# Patient Record
Sex: MALE | Race: WHITE | Employment: FULL TIME | URBAN - METROPOLITAN AREA
[De-identification: names, ages, dates, MRNs, and addresses within clinical notes are randomized per-mention and may not be internally consistent; named-entity substitution may affect disease eponyms.]

---

## 2018-12-11 ENCOUNTER — HOSPITAL ENCOUNTER (EMERGENCY)
Age: 43
Discharge: HOME | End: 2018-12-11
Attending: EMERGENCY MEDICINE
Payer: COMMERCIAL

## 2018-12-11 VITALS
HEART RATE: 82 BPM | RESPIRATION RATE: 14 BRPM | TEMPERATURE: 98.7 F | WEIGHT: 147 LBS | BODY MASS INDEX: 21.77 KG/M2 | OXYGEN SATURATION: 100 % | SYSTOLIC BLOOD PRESSURE: 168 MMHG | HEIGHT: 69 IN | DIASTOLIC BLOOD PRESSURE: 112 MMHG

## 2018-12-11 DIAGNOSIS — H92.01 RIGHT EAR PAIN: Primary | ICD-10-CM

## 2018-12-11 PROCEDURE — 99282 EMERGENCY DEPT VISIT SF MDM: CPT

## 2018-12-11 RX ORDER — OFLOXACIN 3 MG/ML
5 SOLUTION AURICULAR (OTIC) 2 TIMES DAILY
Qty: 10 ML | Refills: 0 | Status: SHIPPED | OUTPATIENT
Start: 2018-12-11 | End: 2018-12-21

## 2018-12-11 RX ORDER — HYDROCODONE BITARTRATE AND ACETAMINOPHEN 5; 325 MG/1; MG/1
1 TABLET ORAL EVERY 6 HOURS PRN
Qty: 5 TABLET | Refills: 0 | Status: SHIPPED | OUTPATIENT
Start: 2018-12-11 | End: 2018-12-14

## 2018-12-11 RX ORDER — AZITHROMYCIN 250 MG/1
TABLET, FILM COATED ORAL
Qty: 1 PACKET | Refills: 0 | Status: SHIPPED | OUTPATIENT
Start: 2018-12-11 | End: 2018-12-21

## 2018-12-11 ASSESSMENT — PAIN DESCRIPTION - DESCRIPTORS
DESCRIPTORS: ACHING;THROBBING
DESCRIPTORS: ACHING;THROBBING

## 2018-12-11 ASSESSMENT — PAIN DESCRIPTION - ORIENTATION
ORIENTATION: RIGHT
ORIENTATION: RIGHT

## 2018-12-11 ASSESSMENT — PAIN - FUNCTIONAL ASSESSMENT: PAIN_FUNCTIONAL_ASSESSMENT: 0-10

## 2018-12-11 ASSESSMENT — PAIN DESCRIPTION - LOCATION
LOCATION: EAR
LOCATION: EAR

## 2018-12-11 ASSESSMENT — PAIN SCALES - GENERAL
PAINLEVEL_OUTOF10: 8
PAINLEVEL_OUTOF10: 8

## 2018-12-11 ASSESSMENT — PAIN DESCRIPTION - PAIN TYPE: TYPE: ACUTE PAIN

## 2018-12-11 ASSESSMENT — PAIN DESCRIPTION - FREQUENCY: FREQUENCY: CONTINUOUS

## 2018-12-11 NOTE — ED NOTES
Ambulates to room #2 with the complaint of right ear ache, states while in [place] he had a cough and then his ear started hurting  states that he has had a ruptured eardrum in the past and \"that what this feels like\"  No drainage and does not believe that he has had a fever.        Latasha Zimmerman RN  12/11/18 0363

## 2018-12-11 NOTE — ED PROVIDER NOTES
Triage Chief Complaint:   Otalgia (right earache that started approx 4 hours ago)    [place]:  Anabell Estrada is a 44 y.o. male that presents complaining of right ear pain. He denies trauma. He denies nausea, vomiting, fever, chills, headache    ROS:  At least 9 systems reviewed and otherwise acutely negative except as in the [address] History reviewed. No pertinent past medical history. History reviewed. No pertinent surgical history. History reviewed. No pertinent family history. Social History     Social History    Marital status:      Spouse name: N/A    Number of children: N/A    Years of education: N/A     Occupational History    Not on file. Social History Main Topics    Smoking status: Never Smoker    Smokeless tobacco: Never Used    Alcohol use Yes      Comment: socially    Drug use: No    Sexual activity: Not Currently     Other Topics Concern    Not on file     Social History Narrative    No narrative on file     No current facility-administered medications for this encounter. Current Outpatient Prescriptions   Medication Sig Dispense Refill    HYDROcodone-acetaminophen (NORCO) 5-325 MG per tablet Take 1 tablet by mouth every 6 hours as needed for Pain for up to 3 days. Susana Bar 5 tablet 0    ofloxacin (FLOXIN) 0.3 % otic solution Place 5 drops into the right ear 2 times daily for 10 days 10 mL 0    azithromycin (ZITHROMAX) 250 MG tablet Take 2 tablets (500 mg) on Day 1, followed by 1 tablet (250 mg) once daily on Days 2 through 5. 1 packet 0     Allergies   Allergen Reactions    Pcn [Penicillins]      Break out and fever    Sulfa Antibiotics      Break out       [unfilled]    Nursing Notes Reviewed    Physical Exam:  Vitals:    12/11/18 0233   BP: (!) 168/112   Pulse: 82   Resp:    Temp:    SpO2:        GENERAL APPEARANCE: Awake and alert. Cooperative. No acute distress. HEAD: Normocephalic. Atraumatic. EYES: EOM's grossly intact. Sclera anicteric.   ENT: Mucous membranes are

## 2022-05-19 ENCOUNTER — OFFICE VISIT (OUTPATIENT)
Dept: ORTHOPEDIC SURGERY | Age: 47
End: 2022-05-19
Payer: COMMERCIAL

## 2022-05-19 ENCOUNTER — TELEPHONE (OUTPATIENT)
Dept: ORTHOPEDIC SURGERY | Age: 47
End: 2022-05-19

## 2022-05-19 VITALS — BODY MASS INDEX: 22.66 KG/M2 | WEIGHT: 153 LBS | HEIGHT: 69 IN

## 2022-05-19 DIAGNOSIS — G81.14 LEFT SPASTIC HEMIPLEGIA (HCC): ICD-10-CM

## 2022-05-19 DIAGNOSIS — G80.2 SPASTIC HEMIPLEGIC CEREBRAL PALSY (HCC): Primary | ICD-10-CM

## 2022-05-19 PROCEDURE — 99204 OFFICE O/P NEW MOD 45 MIN: CPT | Performed by: PHYSICAL MEDICINE & REHABILITATION

## 2022-05-19 RX ORDER — METOPROLOL SUCCINATE 100 MG/1
100 TABLET, EXTENDED RELEASE ORAL DAILY
COMMUNITY
Start: 2021-09-21

## 2022-05-19 NOTE — TELEPHONE ENCOUNTER
Submitted PA for BOTOX  Via [employer] STATUS: NO PA REQUIRED. If this requires a response please respond to the pool ( P [facility] [address]). Thank you please advise patient.

## 2022-05-19 NOTE — PROGRESS NOTES
[facility]) Physical Medicine and Rehabilitation   Outpatient Progress Note  Analilia Mercado. DO Anali, MPH    Patient Name: Kevon Brown MRN: [id number]   Age: 56 y.o. YOB: 1975   Sex: male      CHIEF COMPLAINT     Chief Complaint   Patient presents with    Other     Patient has mild cerebral palsy and has weakness and limited mobility in left arm. His wife works for [employer] with [address] and found your name and he would like to explore Botox        HISTORY OF PRESENT ILLNESS   Kevon Brown is a 56 y.o. male with a lifelong history of left spastic hemiplegia due to cerebral palsy. He has never had any medication for this issue but has participated in therapy and performs a home exercise program every night. He has never been able to supinate his arm and has never been able to fully extend his arm above his head without help. He has talked to his wife and decided to look into botox for possible improvement of his spasticity. Today he is here for a botox evaluation. PAST MEDICAL HISTORY    No past medical history on file. PAST SURGICAL HISTORY   No past surgical history on file. MEDICATIONS     Current Outpatient Medications   Medication Sig Dispense Refill    metoprolol succinate (TOPROL XL) 100 MG extended release tablet Take 100 mg by mouth daily       No current facility-administered medications for this visit. ALLERGIES     Allergies   Allergen Reactions    Pcn [Penicillins]      Break out and fever    Sulfa Antibiotics      Break out       FAMILY HISTORY   No family history on file. SOCIAL HISTORY     Social History     Socioeconomic History    Marital status:      Spouse name: Not on file    Number of children: Not on file    Years of education: Not on file    Highest education level: Not on file   Occupational History    Not on file   Tobacco Use    Smoking status: Never Smoker    Smokeless tobacco: Never Used   Substance and Sexual Activity    Alcohol use:  Yes Comment: socially    Drug use: No    Sexual activity: Not Currently   Other Topics Concern    Not on file   Social History Narrative    Not on file     Social Determinants of Health     Financial Resource Strain:     Difficulty of Paying Living Expenses: Not on file   Food Insecurity:     Worried About Running Out of Food in the Last Year: Not on file    Zahida of Food in the Last Year: Not on file   Transportation Needs:     Lack of Transportation (Medical): Not on file    Lack of Transportation (Non-Medical):  Not on file   Physical Activity:     Days of Exercise per Week: Not on file    Minutes of Exercise per Session: Not on file   Stress:     Feeling of Stress : Not on file   Social Connections:     Frequency of Communication with Friends and Family: Not on file    Frequency of Social Gatherings with Friends and Family: Not on file    Attends Restoration Services: Not on file    Active Member of [address] [place] [address] or Organizations: Not on file    Attends Club or Organization Meetings: Not on file    Marital Status: Not on file   Intimate Partner Violence:     Fear of Current or Ex-Partner: Not on file    Emotionally Abused: Not on file    Physically Abused: Not on file    Sexually Abused: Not on file   Housing Stability:     Unable to Pay for Housing in the Last Year: Not on file    Number of [place] in the Last Year: Not on file    Unstable Housing in the Last Year: Not on file       REVIEW OF SYSTEMS   General: no fever, chills, night sweats, anorexia, malaise, fatigue, or weight change  Hematologic:  no unexplained bleeding or bruising  HEENT:   no nasal congestion, rhinorrhea, sore throat, or facial pain  Respiratory:  no cough, dyspnea, or chest pain  Cardiovascular:  no angina, HOANG, PND, orthopnea, dependent edema, or palpitations  Gastrointestinal:  no nausea, vomiting, diarrhea, constipation, or abdominal pain  Genitourinary:  no urinary urgency, frequency, dysuria, or hematuria  Musculoskeletal: see HPI  Endocrine:  no heat or cold intolerance and no polyphagia, polydipsia, or polyuria  Skin:  no skin eruptions or changing lesions  Neurologic:  no focal weakness, numbness/tingling, tremor, or severe headache. See HPI. See HPI for pertinent positives. PHYSICAL EXAM   Vital Signs:   Vitals:    05/19/22 1325   Weight: 153 lb (69.4 kg)   Height: 5' 9\" (1.753 m)       General appearance: healthy, alert, no distress  Skin: Skin color, texture, turgor normal. No rashes or lesions  HEENT: atraumatic, normocephalic. PERRL  Respiratory: Unlabored breathing  Lymphatic: No adenopathy   Neuro: Alert and oriented, normal distal sensation, normal bilateral DTRs  Vascular: Normal distal capillary and distal pulses  Muskuloskeletal Exam: Left arm ROM- 20 degrees of supination. 70 degrees flexion  Wrist extension -20 degrees  Fingers with flexion at all time unless passively ROM to extension- almost to full extension   Good strength throughout with limited active ROM. Modified bebe scale- bicep 2                                         - FDS- 1+                                         - FDP- 1+                                         - FCU- 2                                         - FCR- 2      IMPRESSION     1. Spastic hemiplegic cerebral palsy (HCC)    2. Left spastic hemiplegia ([address]         PLAN   I had a lengthy discussion with patient today regarding diagnosis and treatment options and recommendations. Left UE spastic hemiplegia- wants local intervention and not systemic oral medication. This is very reasonable in his case as he has been very functional over the past 46 years. Botox intervention should improve his overall ROM, prevent contractures and improve ADLs. 1. Botox- Start 300 units- 0 waste   - FDS- 25 units  - FDP- 25 units  - FCU- 75 units  - FCR- 75 units   - Bicep- 100 units     2. Follow up with precert    3.  Continue HEP      FOLLOWUP     Return in about 3 months (around 8/19/2022). No orders of the defined types were placed in this encounter. No orders of the defined types were placed in this encounter.       Patient was instructed on appropriate use of braces, participation in home exercise programs, healthy lifestyle choices and weight loss as appropriate     Dashawn Briones DO

## 2022-05-19 NOTE — TELEPHONE ENCOUNTER
[facility] INFORMATION    DRUG:  BOTOX [id number]    ADMIN:  [id number], [id number]    DX CODE: G81.14    UNITS:  300    LAST OV DATE: 05/19/22

## 2022-05-20 NOTE — TELEPHONE ENCOUNTER
Left message for patient to call and schedule his Botox injections. I need to order his medication and I can have it delivered in a few days. He may schedule with Dr. Mirna Chappell after 05/25/22.

## 2022-05-26 ENCOUNTER — OFFICE VISIT (OUTPATIENT)
Dept: ORTHOPEDIC SURGERY | Age: 47
End: 2022-05-26
Payer: COMMERCIAL

## 2022-05-26 VITALS — BODY MASS INDEX: 22.66 KG/M2 | HEIGHT: 69 IN | WEIGHT: 153 LBS

## 2022-05-26 DIAGNOSIS — G81.14 LEFT SPASTIC HEMIPLEGIA (HCC): ICD-10-CM

## 2022-05-26 DIAGNOSIS — G80.2 SPASTIC HEMIPLEGIC CEREBRAL PALSY (HCC): Primary | ICD-10-CM

## 2022-05-26 PROCEDURE — 99214 OFFICE O/P EST MOD 30 MIN: CPT | Performed by: PHYSICAL MEDICINE & REHABILITATION

## 2022-05-26 PROCEDURE — 95874 GUIDE NERV DESTR NEEDLE EMG: CPT | Performed by: PHYSICAL MEDICINE & REHABILITATION

## 2022-05-26 PROCEDURE — 64644 CHEMODENERV 1 EXTREM 5/> MUS: CPT | Performed by: PHYSICAL MEDICINE & REHABILITATION

## 2022-05-26 NOTE — PROGRESS NOTES
[facility]) Physical Medicine and Rehabilitation  Outpatient Progress Note  Yoana Taylor. DO Anali    Patient Name: Hipolito Hill MRN: [id number]   Age: 56 y.o. YOB: 1975   Sex: male      [address] Complaint   Patient presents with    Follow-up     Botox injection LUE   spastic hemiplegia     HISTORY OF PRESENT ILLNESS   Hipolito Hill is a 56 y.o. male with a lifelong history of left spastic hemiplegia due to cerebral palsy. He has never had any medication for this issue but has participated in therapy and performs a home exercise program every night. He has never been able to supinate his arm and has never been able to fully extend his arm above his head without help. He has talked to his wife and decided to look into botox for possible improvement of his spasticity. Today he comes in for approved botox injections. PAST MEDICAL HISTORY    No past medical history on file. PAST SURGICAL HISTORY   No past surgical history on file. MEDICATIONS     Current Outpatient Medications   Medication Sig Dispense Refill    metoprolol succinate (TOPROL XL) 100 MG extended release tablet Take 100 mg by mouth daily       Current Facility-Administered Medications   Medication Dose Route Frequency Provider Last Rate Last Admin    onabotulinumtoxin A (BOTOX) injection 300 Units  300 Units IntraMUSCular Once Dashawn Briones DO           ALLERGIES     Allergies   Allergen Reactions    Pcn [Penicillins]      Break out and fever    Sulfa Antibiotics      Break out       FAMILY HISTORY   No family history on file.     SOCIAL HISTORY     Social History     Socioeconomic History    Marital status:      Spouse name: None    Number of children: None    Years of education: None    Highest education level: None   Occupational History    None   Tobacco Use    Smoking status: Never Smoker    Smokeless tobacco: Never Used   Substance and Sexual Activity    Alcohol use: Yes     Comment: socially  Drug use: No    Sexual activity: Not Currently   Other Topics Concern    None   Social History Narrative    None     Social Determinants of Health     Financial Resource Strain:     Difficulty of Paying Living Expenses: Not on file   Food Insecurity:     Worried About Running Out of Food in the Last Year: Not on file    Zahida of Food in the Last Year: Not on file   Transportation Needs:     Lack of Transportation (Medical): Not on file    Lack of Transportation (Non-Medical):  Not on file   Physical Activity:     Days of Exercise per Week: Not on file    Minutes of Exercise per Session: Not on file   Stress:     Feeling of Stress : Not on file   Social Connections:     Frequency of Communication with Friends and Family: Not on file    Frequency of Social Gatherings with Friends and Family: Not on file    Attends Jewish Services: Not on file    Active Member of [address] [place] [address] [employer] or Organizations: Not on file    Attends Club or Organization Meetings: Not on file    Marital Status: Not on file   Intimate Partner Violence:     Fear of Current or Ex-Partner: Not on file    Emotionally Abused: Not on file    Physically Abused: Not on file    Sexually Abused: Not on file   Housing Stability:     Unable to Pay for Housing in the Last Year: Not on file    Number of [place] in the Last Year: Not on file    Unstable Housing in the Last Year: Not on file       REVIEW OF SYSTEMS   General: no fever, chills, night sweats, anorexia, malaise, fatigue, or weight change  Hematologic:  no unexplained bleeding or bruising  HEENT:   no nasal congestion, rhinorrhea, sore throat, or facial pain  Respiratory:  no cough, dyspnea, or chest pain  Cardiovascular:  no angina, HOANG, PND, orthopnea, dependent edema, or palpitations  Gastrointestinal:  no nausea, vomiting, diarrhea, constipation, or abdominal pain  Genitourinary:  no urinary urgency, frequency, dysuria, or hematuria  Musculoskeletal: see HPI  Endocrine:  no heat or cold intolerance and no polyphagia, polydipsia, or polyuria  Skin:  no skin eruptions or changing lesions  Neurologic:  no focal weakness, numbness/tingling, tremor, or severe headache. See HPI. See HPI for pertinent positives. PHYSICAL EXAM   Vital Signs:   Vitals:    05/26/22 1300   Weight: 153 lb (69.4 kg)   Height: 5' 9\" (1.753 m)       General appearance: healthy, alert, no distress  Skin: Skin color, texture, turgor normal. No rashes or lesions  HEENT: atraumatic, normocephalic. PERRL  Respiratory: Unlabored breathing  Lymphatic: No adenopathy   Neuro: Alert and oriented, normal distal sensation, normal bilateral DTRs  Vascular: Normal distal capillary and distal pulses  Muskuloskeletal Exam: Left arm ROM- 20 degrees of supination. 70 degrees flexion  Wrist extension -20 degrees  Fingers with flexion at all time unless passively ROM to extension- almost to full extension   Good strength throughout with limited active ROM.    Modified bebe scale- bicep 2                                         - FDS- 1+                                         - FDP- 1+                                         - FCU- 2                                         - FCR- 2      PROCEDURE     Botox Injection:  The patient consented to the procedure and a time out was performed before proceeding. The left Bicep, FCU, FCR, FDP and FDS was prepped in the normal sterile fashion. A 25 gauge needle was introduced into the muscle with EMG guidance and confirmed using EMG. 300 units of Botox was injected into the muscle after the syringe was pulled back and confirmed negative aspiration. The injection flowed freely, and the patient tolerated the procedure well. Post injection expectations were reviewed with the patient. IMPRESSION     1. Spastic hemiplegic cerebral palsy ([address]    2.  Left spastic hemiplegia ([address]         PLAN   I had a lengthy discussion with patient today regarding diagnosis and treatment options and recommendations. Left UE spastic hemiplegia-   1. Botox- Start 300 units- 0 waste   - FDS- 25 units  - FDP- 25 units  - FCU- 75 units  - FCR- 75 units   - Bicep- 100 units       FOLLOWUP     Return in about 3 months (around 8/26/2022).     Orders Placed This Encounter   Procedures    MT CHEMODENERVATION 1 EXTREMITY 5 OR MORE MUSCLES    MT NEEDLE EMG GUIDANCE FOR CHEMODENERVATION      Orders Placed This Encounter   Medications    onabotulinumtoxin A (BOTOX) injection 300 Units       Patient was instructed on appropriate use of braces, participation in home exercise programs, healthy lifestyle choices and weight loss as appropriate     Dashawn Briones, DO

## 2022-05-26 NOTE — PATIENT INSTRUCTIONS
Joint Injection After [address] Physical Medicine and Rehabilitation   Radu Briones, DO    Refer to this sheet in the next few days. These insructions provide you with information on caring for yourself after you have had a joint injection. Your caregiver also may give you more specific instructions. Your treatment has been planned according to current medical practices, but problems sometimes occur. Call your caregiver if you have any problems or questions after your procedure. After any type of joint injection, it is not uncommon to experience:     A temporary increase in pain which should resolve within 2-3 days   Soreness, swelling, or bruising around the injection site   Mild numbness, tingling, or weakness around the injection site caused by the numbing medicine used before or with the injection    It is also possible to experience the following effects associated with the specific agent after injection:     Corticosteroids (these effects are rare):  o Allergic reaction  o Increased blood sugar levels (if you have diabetes and you notice that your blood sugar levels have increased, notify your caregiver)  o Increased blood pressure levels  o Mood swings   Botox  o Temporary heat or redness  o Temporary rash and itching  o Increased fluid accumulation in the injected join    These effects all should resolve within a day or two after your procedure. HOME CARE INSTRUCTIONS     Limit yourself to light activity the day of your procedure. Avoid lifting heavy objections, bending, stooping or twisting   Take prescription or over-the-counter pain medication as directed by your caregiver   You may apply ice to your injection site to reduce pain and swelling the day of your procedure.  Ice may be applied 3-4 times:  o Put ice in a plastic bag  o Place a towel between your skin and the bag  o Leave the ice on for no longer than 15-20 minutes each time    FOLLOW-UP INSTRUCTIONS    Please make sure you keep your follow-up appointment as indicated by your physician.

## 2022-08-29 ENCOUNTER — OFFICE VISIT (OUTPATIENT)
Dept: ORTHOPEDIC SURGERY | Age: 47
End: 2022-08-29
Payer: COMMERCIAL

## 2022-08-29 VITALS — WEIGHT: 153 LBS | HEIGHT: 69 IN | BODY MASS INDEX: 22.66 KG/M2

## 2022-08-29 DIAGNOSIS — G81.14 LEFT SPASTIC HEMIPLEGIA (HCC): ICD-10-CM

## 2022-08-29 DIAGNOSIS — G80.2 SPASTIC HEMIPLEGIC CEREBRAL PALSY (HCC): Primary | ICD-10-CM

## 2022-08-29 PROCEDURE — 95874 GUIDE NERV DESTR NEEDLE EMG: CPT | Performed by: PHYSICAL MEDICINE & REHABILITATION

## 2022-08-29 PROCEDURE — 99214 OFFICE O/P EST MOD 30 MIN: CPT | Performed by: PHYSICAL MEDICINE & REHABILITATION

## 2022-08-29 PROCEDURE — 64644 CHEMODENERV 1 EXTREM 5/> MUS: CPT | Performed by: PHYSICAL MEDICINE & REHABILITATION

## 2022-08-29 NOTE — PROGRESS NOTES
[facility]) Physical Medicine and Rehabilitation  Outpatient Progress Note  Jeramie Guaman. DO Anali    Patient Name: Vianca Guzman MRN: [id number]   Age: 56 y.o. YOB: 1975   Sex: male      [address] Complaint   Patient presents with    Follow-up     Botox LUE       HISTORY OF PRESENT ILLNESS   Vianca Guzman is a 56 y.o. male with a lifelong history of left spastic hemiplegia due to cerebral palsy. He has never had any medication for this issue but has participated in therapy and performs a home exercise program every night. He has never been able to supinate his arm and has never been able to fully extend his arm above his head without help. He has talked to his wife and decided to look into botox for possible improvement of his spasticity. Today he comes in for approved botox injections. PAST MEDICAL HISTORY    No past medical history on file. PAST SURGICAL HISTORY   No past surgical history on file. MEDICATIONS     Current Outpatient Medications   Medication Sig Dispense Refill    metoprolol succinate (TOPROL XL) 100 MG extended release tablet Take 100 mg by mouth daily       No current facility-administered medications for this visit. ALLERGIES     Allergies   Allergen Reactions    Pcn [Penicillins]      Break out and fever    Sulfa Antibiotics      Break out       FAMILY HISTORY   No family history on file.     SOCIAL HISTORY     Social History     Socioeconomic History    Marital status:    Tobacco Use    Smoking status: Never    Smokeless tobacco: Never   Substance and Sexual Activity    Alcohol use: Yes     Comment: socially    Drug use: No    Sexual activity: Not Currently       REVIEW OF SYSTEMS   General: no fever, chills, night sweats, anorexia, malaise, fatigue, or weight change  Hematologic:  no unexplained bleeding or bruising  HEENT:   no nasal congestion, rhinorrhea, sore throat, or facial pain  Respiratory:  no cough, dyspnea, or chest pain  Cardiovascular:  no angina, HOANG, PND, orthopnea, dependent edema, or palpitations  Gastrointestinal:  no nausea, vomiting, diarrhea, constipation, or abdominal pain  Genitourinary:  no urinary urgency, frequency, dysuria, or hematuria  Musculoskeletal: see HPI  Endocrine:  no heat or cold intolerance and no polyphagia, polydipsia, or polyuria  Skin:  no skin eruptions or changing lesions  Neurologic:  no focal weakness, numbness/tingling, tremor, or severe headache. See HPI. See HPI for pertinent positives. PHYSICAL EXAM   Vital Signs:   Vitals:    08/29/22 1334   Weight: 153 lb (69.4 kg)   Height: 5' 9\" (1.753 m)       General appearance: healthy, alert, no distress  Skin: Skin color, texture, turgor normal. No rashes or lesions  HEENT: atraumatic, normocephalic. PERRL  Respiratory: Unlabored breathing  Lymphatic: No adenopathy   Neuro: Alert and oriented, normal distal sensation, normal bilateral DTRs  Vascular: Normal distal capillary and distal pulses  Muskuloskeletal Exam: Left arm ROM- 20 degrees of supination. 70 degrees flexion  Wrist extension -20 degrees  Fingers with flexion at all time unless passively ROM to extension- almost to full extension   Good strength throughout with limited active ROM. Modified bebe scale- bicep 2                                         - FDS- 1+                                         - FDP- 1+                                         - FCU- 2                                         - FCR- 2    PROCEDURE      Botox Injection:  The patient consented to the procedure and a time out was performed before proceeding. The left Bicep, FCU, FCR, FDP and FDS was prepped in the normal sterile fashion. A 25 gauge needle was introduced into the muscle with EMG guidance and confirmed using EMG. 300 units of Botox was injected into the muscle after the syringe was pulled back and confirmed negative aspiration.   The injection flowed freely, and the patient tolerated the procedure well. Post injection expectations were reviewed with the patient. IMPRESSION     1. Spastic hemiplegic cerebral palsy ([facility]    2. Left spastic hemiplegia ([facility]         PLAN   I had a lengthy discussion with patient today regarding diagnosis and treatment options and recommendations. Left UE spastic hemiplegia-   1. Botox- Start 300 units- 0 waste   - FDS- 25 units  - FDP- 25 units  - FCU- 75 units  - FCR- 75 units   - Bicep- 100 units     Next dose- add 100 more units  50 units pronator teres  150 units bicep  - FDS- 25 units  - FDP- 25 units  - FCU- 75 units  - FCR- 75 units    Total 400 units     FOLLOWUP     Return in about 3 months (around 11/29/2022).     Orders Placed This Encounter   Procedures    [place] CHEMODENERVATION 1 EXTREMITY 5 OR MORE MUSCLES    [place] NEEDLE EMG GUIDANCE FOR CHEMODENERVATION      Orders Placed This Encounter   Medications    onabotulinumtoxin A (BOTOX) injection 300 Units       Patient was instructed on appropriate use of braces, participation in home exercise programs, healthy lifestyle choices and weight loss as appropriate     Dashawn Briones DO

## 2023-02-07 ENCOUNTER — OFFICE VISIT (OUTPATIENT)
Dept: ORTHOPEDIC SURGERY | Age: 48
End: 2023-02-07

## 2023-02-07 VITALS — BODY MASS INDEX: 22.59 KG/M2 | HEIGHT: 69 IN

## 2023-02-07 DIAGNOSIS — G80.2 SPASTIC HEMIPLEGIC CEREBRAL PALSY (HCC): Primary | ICD-10-CM

## 2023-02-07 DIAGNOSIS — G81.14 LEFT SPASTIC HEMIPLEGIA (HCC): ICD-10-CM

## 2023-02-07 NOTE — PROGRESS NOTES
[facility]) Physical Medicine and Rehabilitation  Outpatient Progress Note  Destiney Mukherjee. DO [place]    Patient Name: Umer Li MRN: [id number]   Age: 56 y.o. YOB: 1975   Sex: male      [address] Complaint   Patient presents with    Follow-up     Botox injection LUE         HISTORY OF PRESENT ILLNESS   Umer Li is a 56 y.o. male with a lifelong history of left spastic hemiplegia due to cerebral palsy. He has never had any medication for this issue but has participated in therapy and performs a home exercise program every night. He has never been able to supinate his arm and has never been able to fully extend his arm above his head without help. He has talked to his wife and decided to look into botox for possible improvement of his spasticity. Today he comes in for approved botox injections. Plan for increased dose of botox today. PAST MEDICAL HISTORY    No past medical history on file. PAST SURGICAL HISTORY   No past surgical history on file. MEDICATIONS     Current Outpatient Medications   Medication Sig Dispense Refill    metoprolol succinate (TOPROL XL) 100 MG extended release tablet Take 100 mg by mouth daily       No current facility-administered medications for this visit. ALLERGIES     Allergies   Allergen Reactions    Pcn [Penicillins]      Break out and fever    Sulfa Antibiotics      Break out       FAMILY HISTORY   No family history on file.     SOCIAL HISTORY     Social History     Socioeconomic History    Marital status:    Tobacco Use    Smoking status: Never    Smokeless tobacco: Never   Substance and Sexual Activity    Alcohol use: Yes     Comment: socially    Drug use: No    Sexual activity: Not Currently       REVIEW OF SYSTEMS   General: no fever, chills, night sweats, anorexia, malaise, fatigue, or weight change  Hematologic:  no unexplained bleeding or bruising  HEENT:   no nasal congestion, rhinorrhea, sore throat, or facial pain  Respiratory:  no cough, dyspnea, or chest pain  Cardiovascular:  no angina, HOANG, PND, orthopnea, dependent edema, or palpitations  Gastrointestinal:  no nausea, vomiting, diarrhea, constipation, or abdominal pain  Genitourinary:  no urinary urgency, frequency, dysuria, or hematuria  Musculoskeletal: see HPI  Endocrine:  no heat or cold intolerance and no polyphagia, polydipsia, or polyuria  Skin:  no skin eruptions or changing lesions  Neurologic:  no focal weakness, numbness/tingling, tremor, or severe headache. See HPI. See HPI for pertinent positives. PHYSICAL EXAM   Vital Signs:   Vitals:    02/07/23 1235   Height: 5' 9\" (1.753 m)       General appearance: healthy, alert, no distress  Skin: Skin color, texture, turgor normal. No rashes or lesions  HEENT: atraumatic, normocephalic. PERRL  Respiratory: Unlabored breathing  Lymphatic: No adenopathy   Neuro: Alert and oriented, normal distal sensation, normal bilateral DTRs  Vascular: Normal distal capillary and distal pulses  Muskuloskeletal Exam: Left arm ROM- 20 degrees of supination. 70 degrees flexion  Wrist extension -20 degrees  Fingers with flexion at all time unless passively ROM to extension- almost to full extension   Good strength throughout with limited active ROM. Modified bebe scale- bicep 2                                         - FDS- 1+                                         - FDP- 1+                                         - FCU- 2                                         - FCR- 2    PROCEDURE      Botox Injection:  The patient consented to the procedure and a time out was performed before proceeding. The left Bicep, FCU, FCR, FDP and FDS was prepped in the normal sterile fashion. A 25 gauge needle was introduced into the muscle with EMG guidance and confirmed using EMG. 400 units of Botox was injected into the muscle after the syringe was pulled back and confirmed negative aspiration.   The injection flowed freely, and the patient tolerated the procedure well. Post injection expectations were reviewed with the patient. IMPRESSION     1. Spastic hemiplegic cerebral palsy ([facility] [address]    2. Left spastic hemiplegia ([facility] [address]         PLAN   I had a lengthy discussion with patient today regarding diagnosis and treatment options and recommendations. Left UE spastic hemiplegia-   1. Botox- Start 400 units- 0 waste   50 units pronator teres  150 units bicep  - FDS- 25 units  - FDP- 25 units  - FCU- 75 units  - FCR- 75 units     Total 400 units       FOLLOWUP     Return in about 3 months (around 5/7/2023).     Orders Placed This Encounter   Procedures    LA CHEMODENERVATION 1 EXTREMITY 5 OR MORE MUSCLES    LA NEEDLE EMG GUID W/CHEMODENERVATION        Orders Placed This Encounter   Medications    Onabotulinumtoxin A (BOTOX) injection 400 Units         Patient was instructed on appropriate use of braces, participation in home exercise programs, healthy lifestyle choices and weight loss as appropriate     Dashawn Briones, DO